# Patient Record
Sex: FEMALE | Race: WHITE | NOT HISPANIC OR LATINO | Employment: UNEMPLOYED | URBAN - NONMETROPOLITAN AREA
[De-identification: names, ages, dates, MRNs, and addresses within clinical notes are randomized per-mention and may not be internally consistent; named-entity substitution may affect disease eponyms.]

---

## 2017-04-06 ENCOUNTER — AMBULATORY - GICH (OUTPATIENT)
Dept: RADIOLOGY | Facility: OTHER | Age: 47
End: 2017-04-06

## 2017-04-06 DIAGNOSIS — G37.9 DEMYELINATING DISEASE OF CENTRAL NERVOUS SYSTEM (H): ICD-10-CM

## 2017-04-06 DIAGNOSIS — G37.8: ICD-10-CM

## 2017-04-06 DIAGNOSIS — R27.0 ATAXIA: ICD-10-CM

## 2017-04-10 ENCOUNTER — AMBULATORY - GICH (OUTPATIENT)
Dept: RADIOLOGY | Facility: OTHER | Age: 47
End: 2017-04-10

## 2017-04-10 DIAGNOSIS — G37.9 DEMYELINATING DISEASE OF CENTRAL NERVOUS SYSTEM (H): ICD-10-CM

## 2017-04-11 ENCOUNTER — HOSPITAL ENCOUNTER (OUTPATIENT)
Dept: RADIOLOGY | Facility: OTHER | Age: 47
End: 2017-04-11

## 2017-04-11 ENCOUNTER — AMBULATORY - GICH (OUTPATIENT)
Dept: LAB | Facility: OTHER | Age: 47
End: 2017-04-11

## 2017-04-11 DIAGNOSIS — G37.8: ICD-10-CM

## 2017-04-11 DIAGNOSIS — R27.0 ATAXIA: ICD-10-CM

## 2017-04-11 DIAGNOSIS — G37.9 DEMYELINATING DISEASE OF CENTRAL NERVOUS SYSTEM (H): ICD-10-CM

## 2017-04-11 DIAGNOSIS — Z01.812 ENCOUNTER FOR PREPROCEDURAL LABORATORY EXAMINATION: ICD-10-CM

## 2017-04-11 LAB
CREAT SERPL-MCNC: 0.66 MG/DL (ref 0.7–1.3)
GFR IF NOT AFRICAN AMERICAN - HISTORICAL: >60 ML/MIN/1.73M2

## 2017-04-25 ENCOUNTER — AMBULATORY - GICH (OUTPATIENT)
Dept: LAB | Facility: OTHER | Age: 47
End: 2017-04-25

## 2017-04-25 ENCOUNTER — HOSPITAL ENCOUNTER (OUTPATIENT)
Dept: RADIOLOGY | Facility: OTHER | Age: 47
End: 2017-04-25

## 2017-04-25 ENCOUNTER — HISTORY (OUTPATIENT)
Dept: RADIOLOGY | Facility: OTHER | Age: 47
End: 2017-04-25

## 2017-04-25 DIAGNOSIS — G37.9 DEMYELINATING DISEASE OF CENTRAL NERVOUS SYSTEM (H): ICD-10-CM

## 2017-04-25 LAB
CELLS COUNTED: 0
CLARITY, FLUID: CLEAR
COLOR CSF: COLORLESS
GLUCOSE CSF: 61 MG/DL (ref 40–70)
INR - HISTORICAL: 1.2
Lab: 4 /CU MM
Lab: 5 /CU MM
Lab: NORMAL
MISCELLANEOUS SEND OUT - HISTORICAL: NORMAL
PERFORMING LAB - HISTORICAL: NORMAL
PLATELET # BLD AUTO: 250 THOU/CU MM (ref 140–440)
PMV BLD: 7.5 FL (ref 6.5–11)
PROTEIN TOTAL CSF: 66 MG/DL (ref 15–45)
PROTIME - HISTORICAL: 12.1 SEC (ref 11.9–15.2)
REFERRAL LAB TEST # - HISTORICAL: NORMAL
SOURCE - HISTORICAL: NORMAL
TEST NAME - HISTORICAL: NORMAL

## 2017-04-27 LAB
Lab: 34.6 MG/DL (ref 14–35)
Lab: 5.49 MG/DL
PROTEIN TOTAL CSF: 56 MG/DL (ref 15–40)

## 2017-04-28 LAB
IGG - HISTORICAL: 1200 MG/DL (ref 767–1590)
Lab: 0.55 (ref 0.3–0.6)
Lab: 1.1 MG/DAY
Lab: 34.6 MG/DL (ref 14–35)
Lab: 4190 MG/DL (ref 3500–5200)
Lab: 5.49 MG/DL
Lab: ABNORMAL
Lab: PRESENT
PROTEIN TOTAL CSF: 56 MG/DL (ref 15–40)

## 2018-01-04 NOTE — PROGRESS NOTES
Patient Information     Patient Name MRN Sex Princess Sage [id number] Female 1970      Progress Notes by Geraldine Meadows RN at 2017 10:50 AM     Author:  Geraldine Meadows RN Service:  (none) Author Type:  NURS- Registered Nurse     Filed:  2017 11:09 AM Date of Service:  2017 10:50 AM Status:  Signed     :  Geraldine Meadows RN (NURS- Registered Nurse)            Labwork in process, patient resting in bed, nothing to eat or drink since 0800.    Falls Risk Criteria:    Age 65 and older or under age 4        Sensory deficits    Poor vision    Use of ambulatory aides    Impaired judgment    Unable to walk independently    Meets High Risk criteria for falls:  No    Patient denies recent falls, does have some lightheadedness which is a usual symptom for her

## 2018-01-04 NOTE — MISCELLANEOUS
Patient Information     Patient Name MRN Sex Princess Sage [id number] Female 1970      Protocol by Geraldine Meadows RN at 2017 11:50 AM     Author:  Geraldine Meadows RN Service:  (none) Author Type:  NURS- Registered Nurse     Filed:  2017 12:40 PM Date of Service:  2017 11:50 AM Status:  Signed     :  Geraldine Meadows RN (NURS- Registered Nurse)            Universal Protocol    A. Pre-procedure verification complete yes  1-relevant information / documentation available, reviewed and properly matched to the patient; 2-consent accurate and complete, 3-equipment and supplies available    B. Site marking complete Yes  Site marked if not in continuous attendance with patient    C. TIME OUT completed yes  Time Out was conducted just prior to starting procedure to verify the eight required elements: 1-patient identity, 2-consent accurate and complete, 3-position, 4-correct side/site marked (if applicable), 5-procedure, 6-relevant images / results properly labeled and displayed (if applicable), 7-antibiotics / irrigation fluids (if applicable), 8-safety precautions.

## 2018-01-04 NOTE — PROGRESS NOTES
Patient Information     Patient Name MRN Sex Princess Sage [id number] Female 1970      Progress Notes by Chelo Gonzales at 2017  5:15 PM     Author:  Chelo Gonzales Service:  (none) Author Type:  Other Clinical Staff     Filed:  2017  5:15 PM Date of Service:  2017  5:15 PM Status:  Signed     :  Chelo Gonzales (Other Clinical Staff)            Falls Risk Criteria:    Age 65 and older or under age 4        Sensory deficits    Poor vision    Use of ambulatory aides    Impaired judgment    Unable to walk independently    Meets High Risk criteria for falls:  no

## 2018-01-04 NOTE — PROGRESS NOTES
Patient Information     Patient Name MRN Sex Princess Sage [id number] Female 1970      Progress Notes by Geraldine Meadows RN at 2017 12:20 PM     Author:  Geraldine Meadows RN Service:  (none) Author Type:  NURS- Registered Nurse     Filed:  2017 12:48 PM Date of Service:  2017 12:20 PM Status:  Signed     :  Geraldine Meadows RN (NURS- Registered Nurse)            No bleeding from Lumbar puncture site, sterile bandaid applied to site, Patient returned to room via cart, laying on right side with bed flat and 1 pillow. Comfortable, denies headache.

## 2018-01-04 NOTE — OR POSTOP
Patient Information     Patient Name MRN Princess Aquino [id number] Female 1970      OR PostOp by Rita Bhatti RN at 2017  2:21 PM     Author:  Rita Bhatti RN Service:  (none) Author Type:  NURS- Registered Nurse     Filed:  2017  2:22 PM Date of Service:  2017  2:21 PM Status:  Signed     :  Rita Bhatti RN (NURS- Registered Nurse)            Discharge Note    Data:  Princess Kumar has been discharged home at 1415 via wheelchair accompanied by Registered Nurse.  Met family at ER entrance for ride.    Action:  Written discharge/follow-up instructions were provided to patient. Prescriptions : None.  Belongings sent with patient. Medications from home sent with patient/family: Not Applicable  Equipment none .     Response:  Patient verbalized understanding of discharge instructions, reason for discharge, and necessary follow-up appointments.

## 2018-02-16 ENCOUNTER — DOCUMENTATION ONLY (OUTPATIENT)
Dept: FAMILY MEDICINE | Facility: OTHER | Age: 48
End: 2018-02-16

## 2018-02-16 RX ORDER — ACETAMINOPHEN 160 MG
2000 TABLET,DISINTEGRATING ORAL DAILY
COMMUNITY

## 2018-02-16 RX ORDER — ALBUTEROL SULFATE 90 UG/1
2 AEROSOL, METERED RESPIRATORY (INHALATION) EVERY 4 HOURS PRN
COMMUNITY

## 2018-02-16 RX ORDER — LORAZEPAM 1 MG/1
1 TABLET ORAL EVERY 6 HOURS PRN
COMMUNITY

## 2018-07-19 ENCOUNTER — MEDICAL CORRESPONDENCE (OUTPATIENT)
Dept: HEALTH INFORMATION MANAGEMENT | Facility: OTHER | Age: 48
End: 2018-07-19

## 2018-09-06 ENCOUNTER — HOSPITAL ENCOUNTER (OUTPATIENT)
Dept: MRI IMAGING | Facility: OTHER | Age: 48
End: 2018-09-06
Attending: PSYCHIATRY & NEUROLOGY
Payer: OTHER GOVERNMENT

## 2018-09-06 ENCOUNTER — HOSPITAL ENCOUNTER (OUTPATIENT)
Dept: MRI IMAGING | Facility: OTHER | Age: 48
Discharge: HOME | End: 2018-09-06
Attending: PSYCHIATRY & NEUROLOGY | Admitting: PSYCHIATRY & NEUROLOGY
Payer: OTHER GOVERNMENT

## 2018-09-06 DIAGNOSIS — G35 MS (MULTIPLE SCLEROSIS) (H): ICD-10-CM

## 2018-09-06 PROCEDURE — 70553 MRI BRAIN STEM W/O & W/DYE: CPT

## 2018-09-06 PROCEDURE — 72158 MRI LUMBAR SPINE W/O & W/DYE: CPT

## 2018-09-06 PROCEDURE — 25500064 ZZH RX 255 OP 636: Performed by: RADIOLOGY

## 2018-09-06 PROCEDURE — 72157 MRI CHEST SPINE W/O & W/DYE: CPT

## 2018-09-06 PROCEDURE — A9575 INJ GADOTERATE MEGLUMI 0.1ML: HCPCS | Performed by: RADIOLOGY

## 2018-09-06 PROCEDURE — 72156 MRI NECK SPINE W/O & W/DYE: CPT

## 2018-09-06 RX ADMIN — GADOTERATE MEGLUMINE 15 ML: 376.9 INJECTION INTRAVENOUS at 17:51

## 2018-09-06 RX ADMIN — GADOTERATE MEGLUMINE 11 ML: 376.9 INJECTION INTRAVENOUS at 15:53

## 2018-09-06 RX ADMIN — GADOTERATE MEGLUMINE 15 ML: 376.9 INJECTION INTRAVENOUS at 05:00

## 2018-09-10 RX ADMIN — GADOTERATE MEGLUMINE 15 ML: 376.9 INJECTION INTRAVENOUS at 07:42

## 2020-02-03 ENCOUNTER — TRANSFERRED RECORDS (OUTPATIENT)
Dept: HEALTH INFORMATION MANAGEMENT | Facility: OTHER | Age: 50
End: 2020-02-03

## 2020-03-18 ENCOUNTER — TRANSFERRED RECORDS (OUTPATIENT)
Dept: HEALTH INFORMATION MANAGEMENT | Facility: OTHER | Age: 50
End: 2020-03-18

## 2020-05-11 ENCOUNTER — TRANSFERRED RECORDS (OUTPATIENT)
Dept: HEALTH INFORMATION MANAGEMENT | Facility: OTHER | Age: 50
End: 2020-05-11

## 2020-08-27 ENCOUNTER — APPOINTMENT (OUTPATIENT)
Dept: FAMILY MEDICINE | Facility: OTHER | Age: 50
End: 2020-08-27
Attending: NURSE PRACTITIONER

## 2020-08-27 PROCEDURE — 99499 UNLISTED E&M SERVICE: CPT | Performed by: NURSE PRACTITIONER
